# Patient Record
Sex: FEMALE | Race: BLACK OR AFRICAN AMERICAN | NOT HISPANIC OR LATINO | Employment: FULL TIME | URBAN - METROPOLITAN AREA
[De-identification: names, ages, dates, MRNs, and addresses within clinical notes are randomized per-mention and may not be internally consistent; named-entity substitution may affect disease eponyms.]

---

## 2024-08-26 ENCOUNTER — HOSPITAL ENCOUNTER (EMERGENCY)
Facility: HOSPITAL | Age: 59
Discharge: HOME | End: 2024-08-26
Attending: EMERGENCY MEDICINE
Payer: COMMERCIAL

## 2024-08-26 VITALS
BODY MASS INDEX: 31.1 KG/M2 | HEIGHT: 69 IN | OXYGEN SATURATION: 100 % | RESPIRATION RATE: 18 BRPM | HEART RATE: 86 BPM | SYSTOLIC BLOOD PRESSURE: 129 MMHG | WEIGHT: 210 LBS | DIASTOLIC BLOOD PRESSURE: 88 MMHG | TEMPERATURE: 99 F

## 2024-08-26 DIAGNOSIS — G89.29 CHRONIC MIDLINE LOW BACK PAIN WITH LEFT-SIDED SCIATICA: Primary | ICD-10-CM

## 2024-08-26 DIAGNOSIS — M54.42 CHRONIC MIDLINE LOW BACK PAIN WITH LEFT-SIDED SCIATICA: Primary | ICD-10-CM

## 2024-08-26 PROCEDURE — 96372 THER/PROPH/DIAG INJ SC/IM: CPT | Performed by: EMERGENCY MEDICINE

## 2024-08-26 PROCEDURE — 99284 EMERGENCY DEPT VISIT MOD MDM: CPT | Mod: 25

## 2024-08-26 PROCEDURE — 63600175 PHARM REV CODE 636 W HCPCS: Performed by: EMERGENCY MEDICINE

## 2024-08-26 RX ORDER — FENTANYL CITRATE 50 UG/ML
100 INJECTION, SOLUTION INTRAMUSCULAR; INTRAVENOUS
Status: COMPLETED | OUTPATIENT
Start: 2024-08-26 | End: 2024-08-26

## 2024-08-26 RX ORDER — DEXAMETHASONE SODIUM PHOSPHATE 4 MG/ML
8 INJECTION, SOLUTION INTRA-ARTICULAR; INTRALESIONAL; INTRAMUSCULAR; INTRAVENOUS; SOFT TISSUE
Status: COMPLETED | OUTPATIENT
Start: 2024-08-26 | End: 2024-08-26

## 2024-08-26 RX ORDER — HYDROCODONE BITARTRATE AND ACETAMINOPHEN 5; 325 MG/1; MG/1
1 TABLET ORAL NIGHTLY
COMMUNITY
Start: 2024-07-24

## 2024-08-26 RX ORDER — HYDROCODONE BITARTRATE AND ACETAMINOPHEN 10; 325 MG/1; MG/1
1 TABLET ORAL EVERY 6 HOURS PRN
Qty: 20 TABLET | Refills: 0 | Status: SHIPPED | OUTPATIENT
Start: 2024-08-26 | End: 2024-08-31

## 2024-08-26 RX ORDER — PREGABALIN 75 MG/1
75 CAPSULE ORAL 3 TIMES DAILY
COMMUNITY
Start: 2024-07-24

## 2024-08-26 RX ORDER — PREDNISONE 20 MG/1
60 TABLET ORAL DAILY
Qty: 15 TABLET | Refills: 0 | Status: SHIPPED | OUTPATIENT
Start: 2024-08-26 | End: 2024-08-31

## 2024-08-26 RX ORDER — TIZANIDINE 4 MG/1
4-8 TABLET ORAL NIGHTLY
COMMUNITY
Start: 2024-05-10

## 2024-08-26 RX ADMIN — DEXAMETHASONE SODIUM PHOSPHATE 8 MG: 4 INJECTION, SOLUTION INTRA-ARTICULAR; INTRALESIONAL; INTRAMUSCULAR; INTRAVENOUS; SOFT TISSUE at 06:08

## 2024-08-26 RX ADMIN — FENTANYL CITRATE 100 MCG: 50 INJECTION, SOLUTION INTRAMUSCULAR; INTRAVENOUS at 06:08

## 2024-08-26 NOTE — ED TRIAGE NOTES
Pt complaint of pain at lower back radiaitng down left leg under care pain management, Dr. Manuel Kramer with no relief with hydrocodone RX. Pt relates that she has appt with Dr Atwood on 9/17/24 for further evaluation and PCP Dr Galdamez on 9/4/24. Pt complaint of pain to left hip and groin with no fall or recent injury relating past surgeries

## 2024-12-20 ENCOUNTER — APPOINTMENT (OUTPATIENT)
Dept: RADIOLOGY | Facility: HOSPITAL | Age: 59
End: 2024-12-20
Attending: STUDENT IN AN ORGANIZED HEALTH CARE EDUCATION/TRAINING PROGRAM
Payer: COMMERCIAL

## 2024-12-20 ENCOUNTER — HOSPITAL ENCOUNTER (EMERGENCY)
Facility: HOSPITAL | Age: 59
Discharge: HOME | End: 2024-12-20
Attending: STUDENT IN AN ORGANIZED HEALTH CARE EDUCATION/TRAINING PROGRAM

## 2024-12-20 VITALS
HEART RATE: 80 BPM | HEIGHT: 68 IN | DIASTOLIC BLOOD PRESSURE: 84 MMHG | OXYGEN SATURATION: 99 % | WEIGHT: 210 LBS | RESPIRATION RATE: 20 BRPM | SYSTOLIC BLOOD PRESSURE: 134 MMHG | TEMPERATURE: 98 F | BODY MASS INDEX: 31.83 KG/M2

## 2024-12-20 DIAGNOSIS — S82.831A CLOSED FRACTURE OF DISTAL END OF RIGHT FIBULA, UNSPECIFIED FRACTURE MORPHOLOGY, INITIAL ENCOUNTER: Primary | ICD-10-CM

## 2024-12-20 DIAGNOSIS — R52 PAIN: ICD-10-CM

## 2024-12-20 PROCEDURE — 73610 X-RAY EXAM OF ANKLE: CPT | Mod: TC,RT

## 2024-12-20 PROCEDURE — 99284 EMERGENCY DEPT VISIT MOD MDM: CPT | Mod: 25

## 2024-12-20 PROCEDURE — 96372 THER/PROPH/DIAG INJ SC/IM: CPT | Performed by: STUDENT IN AN ORGANIZED HEALTH CARE EDUCATION/TRAINING PROGRAM

## 2024-12-20 PROCEDURE — 63600175 PHARM REV CODE 636 W HCPCS: Performed by: STUDENT IN AN ORGANIZED HEALTH CARE EDUCATION/TRAINING PROGRAM

## 2024-12-20 PROCEDURE — 29515 APPLICATION SHORT LEG SPLINT: CPT | Mod: RT

## 2024-12-20 PROCEDURE — 25000003 PHARM REV CODE 250: Performed by: STUDENT IN AN ORGANIZED HEALTH CARE EDUCATION/TRAINING PROGRAM

## 2024-12-20 RX ORDER — HYDROCODONE BITARTRATE AND ACETAMINOPHEN 10; 325 MG/1; MG/1
1 TABLET ORAL EVERY 8 HOURS PRN
COMMUNITY
Start: 2024-10-11 | End: 2024-12-20 | Stop reason: ALTCHOICE

## 2024-12-20 RX ORDER — HYDROCODONE BITARTRATE AND ACETAMINOPHEN 10; 325 MG/1; MG/1
1 TABLET ORAL EVERY 6 HOURS PRN
Qty: 14 TABLET | Refills: 0 | Status: SHIPPED | OUTPATIENT
Start: 2024-12-20

## 2024-12-20 RX ORDER — TIZANIDINE HYDROCHLORIDE 4 MG/1
1-2 CAPSULE, GELATIN COATED ORAL NIGHTLY
COMMUNITY
Start: 2024-12-09

## 2024-12-20 RX ORDER — KETOROLAC TROMETHAMINE 10 MG/1
10 TABLET, FILM COATED ORAL 3 TIMES DAILY
Qty: 15 TABLET | Refills: 0 | Status: SHIPPED | OUTPATIENT
Start: 2024-12-20 | End: 2024-12-25

## 2024-12-20 RX ORDER — ALPRAZOLAM 0.25 MG/1
0.25 TABLET ORAL 3 TIMES DAILY PRN
COMMUNITY
Start: 2024-12-18

## 2024-12-20 RX ORDER — KETOROLAC TROMETHAMINE 30 MG/ML
30 INJECTION, SOLUTION INTRAMUSCULAR; INTRAVENOUS
Status: COMPLETED | OUTPATIENT
Start: 2024-12-20 | End: 2024-12-20

## 2024-12-20 RX ORDER — HYDROCODONE BITARTRATE AND ACETAMINOPHEN 10; 325 MG/1; MG/1
1 TABLET ORAL
Status: COMPLETED | OUTPATIENT
Start: 2024-12-20 | End: 2024-12-20

## 2024-12-20 RX ADMIN — KETOROLAC TROMETHAMINE 30 MG: 30 INJECTION, SOLUTION INTRAMUSCULAR at 10:12

## 2024-12-20 RX ADMIN — HYDROCODONE BITARTRATE AND ACETAMINOPHEN 1 TABLET: 10; 325 TABLET ORAL at 09:12

## 2024-12-20 NOTE — ED NOTES
Patient arrived via EMS.  Pt states that she tripped at work outside and fell injuring her ankle.  Swelling to site noted.  Ice applied prior to arrival and in room.

## 2024-12-20 NOTE — ED PROVIDER NOTES
Encounter Date: 12/20/2024       History     Chief Complaint   Patient presents with    Ankle Pain     Tripped and fell while opening up gate at work. Right ankle pain and swelling, pulses good. Patient is on xerolto for history of PE     Patient is a 59-year-old  female past medical history of GERD who presented to the ER today with right-sided ankle pain.  She states that she had an inversion injury to her ankle which was mechanical in nature.  She states that she has pain to the lateral malleolus.  She presented via EMS and did not receive NT analgesia.  She states her pain is substantial and she does want something for pain.  Denies any other complaints.  She states she has a ride home.      Review of patient's allergies indicates:   Allergen Reactions    Codeine Shortness Of Breath     Past Medical History:   Diagnosis Date    GERD (gastroesophageal reflux disease)     History of pulmonary embolism     pt states    Other pulmonary embolism without acute cor pulmonale      Past Surgical History:   Procedure Laterality Date    SPINAL FUSION       No family history on file.  Social History     Tobacco Use    Smoking status: Never    Smokeless tobacco: Never   Substance Use Topics    Alcohol use: Never    Drug use: Never     Review of Systems   Constitutional:  Negative for chills, fatigue and fever.   HENT:  Negative for congestion, sore throat and trouble swallowing.    Eyes:  Negative for pain and visual disturbance.   Respiratory:  Negative for cough, shortness of breath and wheezing.    Cardiovascular:  Negative for chest pain and palpitations.   Gastrointestinal:  Negative for abdominal pain, blood in stool, constipation, diarrhea, nausea and vomiting.   Genitourinary:  Negative for dysuria and hematuria.   Musculoskeletal:  Positive for arthralgias. Negative for back pain and myalgias.   Skin:  Negative for rash and wound.   Neurological:  Negative for seizures, syncope and headaches.    Psychiatric/Behavioral:  Negative for confusion. The patient is not nervous/anxious.        Physical Exam     Initial Vitals [12/20/24 0927]   BP Pulse Resp Temp SpO2   134/84 81 20 97.5 °F (36.4 °C) 99 %      MAP       --         Physical Exam    Nursing note and vitals reviewed.  Constitutional: She appears well-developed and well-nourished. She is not diaphoretic. No distress.   HENT:   Head: Normocephalic.   Right Ear: External ear normal.   Left Ear: External ear normal.   Nose: Nose normal.   Eyes: Conjunctivae and EOM are normal. Right eye exhibits no discharge. Left eye exhibits no discharge. No scleral icterus.   Neck:   Normal range of motion.  Cardiovascular:  Normal rate, regular rhythm and normal heart sounds.     Exam reveals no gallop and no friction rub.       No murmur heard.  Pulmonary/Chest: Breath sounds normal. No stridor. No respiratory distress. She has no wheezes. She has no rhonchi. She has no rales.   Abdominal: Abdomen is soft. She exhibits no distension. There is no abdominal tenderness. There is no rebound and no guarding.   Musculoskeletal:      Cervical back: Normal range of motion.      Comments: Right ankle: DP pulses appreciated equal than the left side.  No lower extremity edema appreciated.  That has tenderness to palpation of the lateral malleolus with mild soft tissue swelling.  No ecchymosis noted no gross deformity on exam.  No tenderness over the 5th metatarsal or the medial malleolus.     Neurological: She is alert.   Skin: Skin is warm. No rash noted. No erythema.   Psychiatric: She has a normal mood and affect. Her behavior is normal.         ED Course   Splint Application    Date/Time: 12/20/2024 10:32 AM    Performed by: Rakesh Mckeon MD  Authorized by: Rakesh Mckeon MD  Consent Done: Yes  Consent: Verbal consent obtained.  Risks and benefits: risks, benefits and alternatives were discussed  Consent given by: patient  Imaging studies: imaging studies  available  Required items: required blood products, implants, devices, and special equipment available  Patient identity confirmed: , name, verbally with patient and MRN  Location details: right ankle  Splint type: short leg  Supplies used: Ortho-Glass  Post-procedure: The splinted body part was neurovascularly unchanged following the procedure.  Patient tolerance: Patient tolerated the procedure well with no immediate complications        Labs Reviewed - No data to display       Imaging Results              X-Ray Ankle Complete Right (Final result)  Result time 24 10:15:57      Final result by Disha Jasmine MD (24 10:15:57)                   Impression:      Nondisplaced lateral malleolar fracture.      Electronically signed by: Disha Jasmine  Date:    2024  Time:    10:15               Narrative:    EXAMINATION:  XR ANKLE COMPLETE 3 VIEW RIGHT    CLINICAL HISTORY:  Pain, unspecified    TECHNIQUE:  AP, lateral, and oblique images of the right ankle were performed.    COMPARISON:  None.    FINDINGS:  Nondisplaced fracture at the lateral malleolus.    There are plantar and dorsal calcaneal enthesophytes.                                       Medications   HYDROcodone-acetaminophen  mg per tablet 1 tablet (1 tablet Oral Given 24 0542)   ketorolac injection 30 mg (30 mg Intramuscular Given 24 1248)     Medical Decision Making  Differentials: Fracture, dislocation, sprain   History in his the patient   59-year-old well-appearing female with stable vital signs presents to the ER today right-sided ankle pain after an inversion injury.  X-ray did confirm a fibula fracture.  Posterior short-leg splint in place and neurovascular exam afterwards within normal limits.  Norco given here as patient has a ride home.  Crutches provided.  Nonweightbearing discussed.  [facility] sent pharmacy and driving restrictions and fall precautions were discussed while using this medication.   Crutches provided.  Ortho referral placed.  All questions answered in layman's terms and return precautions were discussed    Amount and/or Complexity of Data Reviewed  Radiology: ordered. Decision-making details documented in ED Course.    Risk  Prescription drug management.                                      Clinical Impression:  Final diagnoses:  [R52] Pain  [S82.831A] Closed fracture of distal end of right fibula, unspecified fracture morphology, initial encounter (Primary)          ED Disposition Condition    Discharge Stable          ED Prescriptions       Medication Sig Dispense Start Date End Date Auth. Provider    HYDROcodone-acetaminophen (NORCO)  mg per tablet Take 1 tablet by mouth every 6 (six) hours as needed for Pain. 14 tablet 12/20/2024 -- Rakesh Mckeon MD          Follow-up Information       Follow up With Specialties Details Why Contact Info    Tariq Galdamez MD Family Medicine Schedule an appointment as soon as possible for a visit   [address]  [place] [id number]  [phone]      [facility] - Emergency Dept Emergency Medicine  If symptoms worsen [address] [facility] [id number] [phone]             Rakesh Mckeon MD  12/20/24 3760

## 2025-04-24 ENCOUNTER — HOSPITAL ENCOUNTER (EMERGENCY)
Facility: HOSPITAL | Age: 60
Discharge: HOME | End: 2025-04-24
Attending: EMERGENCY MEDICINE
Payer: COMMERCIAL

## 2025-04-24 VITALS
RESPIRATION RATE: 18 BRPM | BODY MASS INDEX: 30.51 KG/M2 | TEMPERATURE: 98 F | WEIGHT: 206 LBS | HEART RATE: 85 BPM | SYSTOLIC BLOOD PRESSURE: 127 MMHG | HEIGHT: 69 IN | DIASTOLIC BLOOD PRESSURE: 91 MMHG | OXYGEN SATURATION: 99 %

## 2025-04-24 DIAGNOSIS — M51.362 DEGENERATION OF INTERVERTEBRAL DISC OF LUMBAR REGION WITH DISCOGENIC BACK PAIN AND LOWER EXTREMITY PAIN: ICD-10-CM

## 2025-04-24 DIAGNOSIS — M54.42 CHRONIC MIDLINE LOW BACK PAIN WITH LEFT-SIDED SCIATICA: Primary | ICD-10-CM

## 2025-04-24 DIAGNOSIS — G89.29 CHRONIC MIDLINE LOW BACK PAIN WITH LEFT-SIDED SCIATICA: Primary | ICD-10-CM

## 2025-04-24 DIAGNOSIS — M47.816 LUMBAR SPONDYLOSIS: ICD-10-CM

## 2025-04-24 PROCEDURE — 63600175 PHARM REV CODE 636 W HCPCS

## 2025-04-24 PROCEDURE — 96372 THER/PROPH/DIAG INJ SC/IM: CPT

## 2025-04-24 PROCEDURE — 99284 EMERGENCY DEPT VISIT MOD MDM: CPT | Mod: 25

## 2025-04-24 PROCEDURE — 25000003 PHARM REV CODE 250

## 2025-04-24 RX ORDER — CYCLOBENZAPRINE HCL 10 MG
10 TABLET ORAL 3 TIMES DAILY PRN
Qty: 20 TABLET | Refills: 0 | Status: SHIPPED | OUTPATIENT
Start: 2025-04-24

## 2025-04-24 RX ORDER — CYCLOBENZAPRINE HCL 10 MG
10 TABLET ORAL
Status: COMPLETED | OUTPATIENT
Start: 2025-04-24 | End: 2025-04-24

## 2025-04-24 RX ORDER — DEXAMETHASONE SODIUM PHOSPHATE 4 MG/ML
8 INJECTION, SOLUTION INTRA-ARTICULAR; INTRALESIONAL; INTRAMUSCULAR; INTRAVENOUS; SOFT TISSUE
Status: COMPLETED | OUTPATIENT
Start: 2025-04-24 | End: 2025-04-24

## 2025-04-24 RX ADMIN — CYCLOBENZAPRINE 10 MG: 10 TABLET, FILM COATED ORAL at 12:04

## 2025-04-24 RX ADMIN — DEXAMETHASONE SODIUM PHOSPHATE 8 MG: 4 INJECTION, SOLUTION INTRA-ARTICULAR; INTRALESIONAL; INTRAMUSCULAR; INTRAVENOUS; SOFT TISSUE at 12:04

## 2025-04-24 NOTE — Clinical Note
"Liliana Menezes" Luis was seen and treated in our emergency department on 4/24/2025.  She may return to work on 04/25/2025.       If you have any questions or concerns, please don't hesitate to call.       RN    "

## 2025-04-24 NOTE — DISCHARGE INSTRUCTIONS
Take over-the-counter Tylenol as needed for pain.  Use called for warm compresses to help with the pain.  Stretch daily.  Take warm Epsom salt baths to help with pain.    See your family doctor in one to 2 days for further evaluation, workup, and treatment as necessary.  Follow up with your orthopedic and pain management doctors.  If you experience any new or worsening symptoms return to the emergency department.    The exam and treatment you received in Emergency Room was for an urgent problem and NOT INTENDED AS COMPLETE CARE. It is important that you FOLLOW UP with a doctor for ongoing care. If your symptoms become WORSE or you DO NOT IMPROVE and you are unable to reach your health care provider, you should RETURN to the emergency department. The Emergency Room doctor has provided a PRELIMINARY INTERPRETATION of all your STUDIES. A final interpretation may be done after you are discharged. IF A CHANGE in your diagnosis or treatment is needed WE WILL CONTACT YOU. It is critical that we have a CURRENT PHONE NUMBER FOR YOU.

## 2025-04-24 NOTE — ED PROVIDER NOTES
Encounter Date: 4/24/2025       History     Chief Complaint   Patient presents with    Back Pain     See MDM    The history is provided by the patient. No  was used.     Review of patient's allergies indicates:   Allergen Reactions    Codeine Shortness Of Breath     Past Medical History:   Diagnosis Date    GERD (gastroesophageal reflux disease)     History of pulmonary embolism     pt states    Other pulmonary embolism without acute cor pulmonale      Past Surgical History:   Procedure Laterality Date    SPINAL FUSION       No family history on file.  Social History[1]  Review of Systems   Musculoskeletal:  Positive for back pain.   All other systems reviewed and are negative.      Physical Exam     Initial Vitals [04/24/25 1055]   BP Pulse Resp Temp SpO2   (!) 127/91 85 18 97.9 °F (36.6 °C) 99 %      MAP       --         Physical Exam    Nursing note and vitals reviewed.  Constitutional: She appears well-developed and well-nourished. She is not diaphoretic. No distress.   Patient is sitting comfortably on exam bed.  She does not appear to be in any acute pain or distress at this time.  She is calm and cooperative during my exam and laughs occasionally.   HENT:   Head: Normocephalic and atraumatic.   Eyes: EOM are normal.   Neck:   Normal range of motion.  Cardiovascular:  Normal rate, regular rhythm and intact distal pulses.           Pulmonary/Chest: Breath sounds normal.   Musculoskeletal:         General: No edema. Normal range of motion.      Cervical back: Normal and normal range of motion.      Thoracic back: Normal.      Lumbar back: Tenderness and bony tenderness present. Normal range of motion. Negative right straight leg raise test.        Back:       Comments: Lower lumbar bony TTP.  TTP to the left lower back.  No obvious overlying skin changes noted.  No lower extremity edema.  Pulses and sensation intact.  DTR WNL.  Positive left SLR.  Patient is ambulatory.  Lumbar scar noted  from previous surgery.     Neurological: She is alert and oriented to person, place, and time. She has normal strength and normal reflexes. No sensory deficit. GCS score is 15. GCS eye subscore is 4. GCS verbal subscore is 5. GCS motor subscore is 6.   Skin: Skin is warm and dry. Capillary refill takes less than 2 seconds. No rash and no abscess noted. No erythema.   Psychiatric: She has a normal mood and affect.         ED Course   Procedures  Labs Reviewed - No data to display       Imaging Results              CT Lumbar Spine Without Contrast (Final result)  Result time 04/24/25 12:59:08      Final result by Dane Neves MD (04/24/25 12:59:08)                   Impression:      1.  Lumbar degenerative disc disease and spondylosis.    2.  No acute fracture identified.      Electronically signed by: Dane Neves  Date:    04/24/2025  Time:    12:59               Narrative:    EXAMINATION:  CT LUMBAR SPINE WITHOUT CONTRAST    CLINICAL HISTORY:  Low back pain, increased fracture risk;    TECHNIQUE:  Multidetector axial images were performed of the lumbar spine without contrast and the images were reformatted.    Dose length product of 942 mGycm. Automated exposure control was utilized to minimize radiation dose.    COMPARISON:  Lumbar spine radiographs October 5, 2022.    FINDINGS:  There is mild lumbar levoscoliotic curvature centered at L4 and L5.  Lumbar vertebrae stature is preserved throughout.  No acute fracture or malalignment identified.  There are posterior lumbar fusions with transpedicular screws and vladislav constructs at L5 and S1.  There is L5-S1 discectomy with disc spacer placement and right decompression laminectomy. Disc segmental analysis is given below:    At L1-L2, disc is unremarkable.  Mild left facet arthropathy.  No significant central canal stenosis.  There are no narrowings of the neural foramen.    At L2-L3, there is bulging of annulus fibrosis which slightly indents the ventral thecal sac.   There is mostly left-sided facet arthropathy.  There is no significant central canal stenosis.  Bilateral neural foramen are patent.    At L3-L4, there is generalized disc bulge which indents and slightly flattens the ventral thecal sac.  Bilateral facet arthropathy.  Central canal stenosis is mild.  There are no significant narrowings of the neural foramen.    At L4-L5, there is broad disc protrusion which compresses and flattens the ventral thecal sac.  Bilateral ligamentum flavum thickening and facet arthropathy.  These findings cause mild-to-moderate central canal stenosis.  Disc causes narrowing of the right lateral recess with some compression upon the exiting nerve root and also moderate narrowing of the right neural foramen.  There is also mild narrowing of the left neural foramen.    At L5-S1, there is no disc herniation.  Central canal is not stenosed with right decompression laminectomy.  Right neural foramen is patent.  There is marked narrowing of the left neural foramen caused by uncovertebral arthropathy and spur like growth from the left facet joint.                                       Medications   dexAMETHasone injection 8 mg (8 mg Intramuscular Given 4/24/25 1251)   cyclobenzaprine tablet 10 mg (10 mg Oral Given 4/24/25 1251)     Medical Decision Making  The patient is a 59 y.o. female with a pertinent PMHX of PE on blood thinners, GERD, L5-S1 fusion in 2017 who presents to the Emergency Department with her friend with a chief complaint of low back pain. Symptoms began in 2017 and have been worsening over the last few weeks. Associated symptoms include nothing. The back pain is currently rated as a 10/10 in severity and described as painful with radiation down the left leg.  Symptoms are aggravated with nothing and alleviated with nothing. The patient denies saddle anesthesia, numbness, tingling, recent trauma/injury, incontinence, chest pain, shortness of breath, fever, IV drug use, wounds,  "swelling, overlying skin changes, inability to ambulate, so diabetes, kidney issues, bleeding disorder. They report taking nothing prior to arrival with no relief of symptoms. No other reported symptoms at this time.  Patient is requesting a steroid injection as this helped her pain in the past.  Per nursing - Pt States he has had a back sx w/ Dr. Aleksandr Cardenas in 2017 & was told after a MRI in November that she need another one but she doesn't want to have it b/c the first one didn't bring her much relief.  The last time she saw Dr. Atwood and was in November.  She has not seen her pain management doctor for epidural injections since the end of last year as well.  She stopped going to physical therapy at the end of last year.    Pertinent physical exam findings include Lower lumbar bony TTP.  TTP to the left lower back.  No obvious overlying skin changes noted.  No lower extremity edema.  Pulses and sensation intact.  DTR WNL.  Positive left SLR.  Patient is ambulatory.  Vital signs on arrival notable for HTN, otherwise stable.  Risks and benefits of steroid injection discussed with the patient.  Patient verbalized understanding and would like injection at this time.  Dexamethasone and Flexeril given while in the ER.    Lumbar CT impression: Lumbar degenerative disc disease and spondylosis.  No acute fracture identified.    Imaging findings discussed with patient.  She is endorsing improvement in her symptoms since being given medication while in the ER.  Patient has an appointment coming up with her primary care provider.  She will call  and her pain management doctor to schedule follow up appointment.  Discharge instructions and strict return precautions provided. Patient verbalized understanding and agreement of plan. All questions answered.    Portions of this note have been created with voice recognition software. Occasional "wrong-words" or "sound alike" substitutions may have occurred due to " inherent limitations of voice software. Please read the note carefully and recognize, using context, word substitutions may have occurred.       Amount and/or Complexity of Data Reviewed  Radiology: ordered. Decision-making details documented in ED Course.    Risk  Prescription drug management.  Risk Details: Strict ED return precautions provided. I have spoken with the patient and/or caregivers. I have explained the patient's condition, diagnoses and treatment plan based on the information available to me at this time. I have answered the patient's and/or caregiver's questions and addressed any concerns. The patient and/or caregivers have as good an understanding of the patient's diagnosis, condition and treatment plan as can be expected at this point. The patient's condition is stable and appropriate for discharge from the emergency department.      The patient will pursue further outpatient evaluation with the primary care physician or other designated or consulting physician as outlined in the discharge instructions. The patient and/or caregivers are agreeable to this plan of care and follow-up instructions have been explained in detail. The patient and/or caregivers have received these instructions in written format and have expressed an understanding of the discharge instructions. The patient and/or caregivers are aware that any significant change in condition or worsening of symptoms should prompt an immediate return to this or the closest emergency department or a call to 911.        Additional MDM:   Differential Diagnosis:   Judging by the patient's chief complaint and pertinent history, the patient has the following possible differential diagnoses, including but not limited to the following:  Abscess, fracture, dislocation, sprain, strain, muscle spasm  Some of these are deemed to be lower likelihood and some more likely based on my physical exam and history combined with possible lab work and/or imaging  studies. Please see the pertinent studies, and refer to the HPI. Some of these diagnoses will take further evaluation to fully rule out, perhaps as an outpatient and the patient was encouraged to follow up when discharged for more comprehensive evaluation.                                       Clinical Impression:  Final diagnoses:  [M51.362] Degeneration of intervertebral disc of lumbar region with discogenic back pain and lower extremity pain  [M47.816] Lumbar spondylosis  [M54.42, G89.29] Chronic midline low back pain with left-sided sciatica (Primary)          ED Disposition Condition    Discharge Stable          ED Prescriptions       Medication Sig Dispense Start Date End Date Auth. Provider    cyclobenzaprine (FLEXERIL) 10 MG tablet Take 1 tablet (10 mg total) by mouth 3 (three) times daily as needed for Muscle spasms. 20 tablet 4/24/2025 -- Ellie Hill PA-C          Follow-up Information       Follow up With Specialties Details Why Contact Info    Tariq Galdamez MD Family Medicine Call in 1 week As needed [address] [place] [id number] [phone]      Aleksandr Cardenas II, MD Orthopedic Surgery Call in 1 week As needed [address] [place] [id number] [phone]      Pain management  Call in 1 week As needed                [1]   Social History  Tobacco Use    Smoking status: Never    Smokeless tobacco: Never   Substance Use Topics    Alcohol use: Never    Drug use: Never        Ellie Hill PA-C  04/24/25 [id number]

## 2025-04-24 NOTE — ED NOTES
States he has had a back sx w/ Dr. Aleksandr Cardenas in 2017 & was told after a recent MRI that she need another one but she doesn't want to have it b/c the first one didn't bring her much relief.